# Patient Record
Sex: MALE | Race: WHITE | NOT HISPANIC OR LATINO | Employment: FULL TIME | URBAN - NONMETROPOLITAN AREA
[De-identification: names, ages, dates, MRNs, and addresses within clinical notes are randomized per-mention and may not be internally consistent; named-entity substitution may affect disease eponyms.]

---
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
## 2019-05-15 ENCOUNTER — OFFICE VISIT (OUTPATIENT)
Dept: FAMILY MEDICINE | Facility: OTHER | Age: 19
End: 2019-05-15
Attending: FAMILY MEDICINE
Payer: MEDICAID

## 2019-05-15 VITALS
HEIGHT: 70 IN | SYSTOLIC BLOOD PRESSURE: 124 MMHG | HEART RATE: 80 BPM | WEIGHT: 244 LBS | BODY MASS INDEX: 34.93 KG/M2 | TEMPERATURE: 97.8 F | DIASTOLIC BLOOD PRESSURE: 84 MMHG | RESPIRATION RATE: 16 BRPM

## 2019-05-15 DIAGNOSIS — Z23 NEED FOR HPV VACCINATION: ICD-10-CM

## 2019-05-15 DIAGNOSIS — Z13.0 SCREENING FOR DEFICIENCY ANEMIA: ICD-10-CM

## 2019-05-15 DIAGNOSIS — Z13.220 SCREENING FOR HYPERLIPIDEMIA: ICD-10-CM

## 2019-05-15 DIAGNOSIS — Z13.228 SCREENING FOR METABOLIC DISORDER: ICD-10-CM

## 2019-05-15 DIAGNOSIS — Z00.00 VISIT FOR PREVENTIVE HEALTH EXAMINATION: Primary | ICD-10-CM

## 2019-05-15 LAB
ALBUMIN SERPL-MCNC: 4.7 G/DL (ref 3.5–5.7)
ALP SERPL-CCNC: 69 U/L (ref 34–104)
ALT SERPL W P-5'-P-CCNC: 35 U/L (ref 7–52)
ANION GAP SERPL CALCULATED.3IONS-SCNC: 8 MMOL/L (ref 3–14)
AST SERPL W P-5'-P-CCNC: 23 U/L (ref 13–39)
BASOPHILS # BLD AUTO: 0.1 10E9/L (ref 0–0.2)
BASOPHILS NFR BLD AUTO: 0.7 %
BILIRUB SERPL-MCNC: 0.8 MG/DL (ref 0.3–1)
BUN SERPL-MCNC: 14 MG/DL (ref 7–25)
CALCIUM SERPL-MCNC: 9.8 MG/DL (ref 8.6–10.3)
CHLORIDE SERPL-SCNC: 102 MMOL/L (ref 98–107)
CHOLEST SERPL-MCNC: 150 MG/DL
CO2 SERPL-SCNC: 29 MMOL/L (ref 21–31)
CREAT SERPL-MCNC: 0.87 MG/DL (ref 0.7–1.3)
DIFFERENTIAL METHOD BLD: NORMAL
EOSINOPHIL # BLD AUTO: 0.3 10E9/L (ref 0–0.7)
EOSINOPHIL NFR BLD AUTO: 4.5 %
ERYTHROCYTE [DISTWIDTH] IN BLOOD BY AUTOMATED COUNT: 12.6 % (ref 10–15)
GFR SERPL CREATININE-BSD FRML MDRD: >90 ML/MIN/{1.73_M2}
GLUCOSE SERPL-MCNC: 95 MG/DL (ref 70–105)
HCT VFR BLD AUTO: 47.1 % (ref 40–53)
HDLC SERPL-MCNC: 35 MG/DL (ref 23–92)
HGB BLD-MCNC: 16.6 G/DL (ref 13.3–17.7)
IMM GRANULOCYTES # BLD: 0 10E9/L (ref 0–0.4)
IMM GRANULOCYTES NFR BLD: 0.3 %
LDLC SERPL CALC-MCNC: 81 MG/DL
LYMPHOCYTES # BLD AUTO: 1.9 10E9/L (ref 0.8–5.3)
LYMPHOCYTES NFR BLD AUTO: 28.1 %
MCH RBC QN AUTO: 29.8 PG (ref 26.5–33)
MCHC RBC AUTO-ENTMCNC: 35.2 G/DL (ref 31.5–36.5)
MCV RBC AUTO: 85 FL (ref 78–100)
MONOCYTES # BLD AUTO: 0.5 10E9/L (ref 0–1.3)
MONOCYTES NFR BLD AUTO: 7.3 %
NEUTROPHILS # BLD AUTO: 4.1 10E9/L (ref 1.6–8.3)
NEUTROPHILS NFR BLD AUTO: 59.1 %
NONHDLC SERPL-MCNC: 115 MG/DL
PLATELET # BLD AUTO: 238 10E9/L (ref 150–450)
POTASSIUM SERPL-SCNC: 4.3 MMOL/L (ref 3.5–5.1)
PROT SERPL-MCNC: 7.4 G/DL (ref 6.4–8.9)
RBC # BLD AUTO: 5.57 10E12/L (ref 4.4–5.9)
SODIUM SERPL-SCNC: 139 MMOL/L (ref 134–144)
TRIGL SERPL-MCNC: 172 MG/DL
WBC # BLD AUTO: 6.9 10E9/L (ref 4–11)

## 2019-05-15 PROCEDURE — 99395 PREV VISIT EST AGE 18-39: CPT | Performed by: FAMILY MEDICINE

## 2019-05-15 PROCEDURE — 90651 9VHPV VACCINE 2/3 DOSE IM: CPT

## 2019-05-15 PROCEDURE — 90471 IMMUNIZATION ADMIN: CPT

## 2019-05-15 PROCEDURE — 36415 COLL VENOUS BLD VENIPUNCTURE: CPT | Mod: ZL | Performed by: FAMILY MEDICINE

## 2019-05-15 PROCEDURE — 80061 LIPID PANEL: CPT | Mod: ZL | Performed by: FAMILY MEDICINE

## 2019-05-15 PROCEDURE — 85025 COMPLETE CBC W/AUTO DIFF WBC: CPT | Mod: ZL | Performed by: FAMILY MEDICINE

## 2019-05-15 PROCEDURE — 80053 COMPREHEN METABOLIC PANEL: CPT | Mod: ZL | Performed by: FAMILY MEDICINE

## 2019-05-15 ASSESSMENT — MIFFLIN-ST. JEOR: SCORE: 2127

## 2019-05-15 ASSESSMENT — PAIN SCALES - GENERAL: PAINLEVEL: NO PAIN (0)

## 2019-05-15 NOTE — NURSING NOTE
"Chief Complaint   Patient presents with     Physical       Initial /84   Pulse 80   Temp 97.8  F (36.6  C) (Temporal)   Resp 16   Ht 1.784 m (5' 10.25\")   Wt 110.7 kg (244 lb)   BMI 34.76 kg/m   Estimated body mass index is 34.76 kg/m  as calculated from the following:    Height as of this encounter: 1.784 m (5' 10.25\").    Weight as of this encounter: 110.7 kg (244 lb).  Medication Reconciliation: complete    Bhavna Goss LPN     Prior to injection, verified patient identity using patient's name and date of birth.  Due to injection administration, patient instructed to remain in clinic for 15 minutes  afterwards, and to report any adverse reaction to me immediately.    Gardasil    Drug Amount Wasted:  None.  Vial/Syringe: Syringe  Expiration Date:  09/20/21  Bhavna Goss LPN..................5/15/2019   10:01 AM      "

## 2019-05-15 NOTE — PROGRESS NOTES
"Nursing Notes:   Bhavna Goss LPN  5/15/2019  9:25 AM  Signed  Chief Complaint   Patient presents with     Physical       Initial /84   Pulse 80   Temp 97.8  F (36.6  C) (Temporal)   Resp 16   Ht 1.784 m (5' 10.25\")   Wt 110.7 kg (244 lb)   BMI 34.76 kg/m    Estimated body mass index is 34.76 kg/m  as calculated from the following:    Height as of this encounter: 1.784 m (5' 10.25\").    Weight as of this encounter: 110.7 kg (244 lb).  Medication Reconciliation: complete    Bhavna Goss LPN    SUBJECTIVE:  Galen Frank  is a 19 year old male who comes in for complete evaluation.  I last saw him in 2018 for a physical.  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 At that visit, we can give him his second HPV shot and his second hepatitis A shot.  He is otherwise up-to-date on immunizations although his tetanus will be due next year.    He wants us to check some moles on his back to check.     He is still working at [employer].  He may be looking for another job. 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 He is dating the same girl for 4 years. She is an  at [employer] while working on her RN. She just got accepted after completing her generals.      May have some family history of diabetes on dad's side.     Past Medical, Family, and Social History reviewed and updated as noted below.   ROS is negative except as noted above       Allergies   Allergen Reactions     Gentamicin      Other reaction(s): Angioedema   ,   Family History   Problem Relation Age of Onset     Heart Disease Maternal Grandmother         Heart Disease,MI,      Heart Disease Other         Heart Disease,Valve replacement     Asthma Sister         Asthma   ,   No current outpatient medications on file.     No current facility-administered medications for this visit.    , History reviewed. No " "pertinent past medical history.,   Patient Active Problem List    Diagnosis Date Noted     Dyshidrotic hand dermatitis 11/13/2012     Priority: Medium     Obesity 07/31/2012     Priority: Medium   ,   Past Surgical History:   Procedure Laterality Date     OTHER SURGICAL HISTORY      2006,600000,OTHER    and   Social History     Tobacco Use     Smoking status: Never Smoker     Smokeless tobacco: Never Used   Substance Use Topics     Alcohol use: No     OBJECTIVE:  /84   Pulse 80   Temp 97.8  F (36.6  C) (Temporal)   Resp 16   Ht 1.784 m (5' 10.25\")   Wt 110.7 kg (244 lb)   BMI 34.76 kg/m     EXAM:  General Appearance: Pleasant, alert, appropriate appearance for age. No acute distress  Head Exam: Normal. Normocephalic, atraumatic.  Eye Exam:  Normal external eyes, conjunctivae, lids, cornea. DEVAUGHN. EOMI  Ear Exam: Normal TM's bilaterally. Normal auditory canals and external ears. Non-tender.  Nose Exam: Normal external nose, mucus membranes, and septum.  OroPharynx Exam:  Dental hygiene adequate. Normal buccal mucosa. Normal pharynx.  Neck Exam:  Supple, no masses or nodes. No audible bruits  Thyroid Exam: No nodules or enlargement.  Chest/Respiratory Exam: Normal chest wall and respirations. Clear to auscultation.  Cardiovascular Exam: Regular rate and rhythm. S1, S2, no murmur, click, gallop, or rubs.  Gastrointestinal Exam: Soft, non-tender, no masses or organomegaly..   Genitourinary Exam Male: Normal male genitalia. No discharge or penile ulcerations. No testicular masses or swelling. No evidence of hernia  Lymphatic Exam: Non-palpable nodes in neck, clavicular regions.  Musculoskeletal Exam: Back is straight and non-tender, full ROM of upper and lower extremities.  Foot Exam: Left and right foot: good pedal pulses  Skin: no rash or abnormalities  Neurologic Exam: Nonfocal, normal gross motor, tone coordination and no tremor.  Psychiatric Exam: Alert and oriented - appropriate affect. "     ASSESSMENT/Plan :    Galen was seen today for physical.    Diagnoses and all orders for this visit:    Visit for preventive health examination    Need for HPV vaccination  -     GH IMM-  HUMAN PAPILLOMA VIRUS (GARDASIL 9) VACCINE    Screening for deficiency anemia  -     CBC with platelets differential; Future    Screening for metabolic disorder  -     Comprehensive metabolic panel; Future    Screening for hyperlipidemia  -     Lipid Profile; Future      .Will notify of lab results when available. Discussed diet, exercise and healthy lifestyle changes. HPV #3 today.      Heladio Walton MD

## 2019-05-15 NOTE — NURSING NOTE
"Chief Complaint   Patient presents with     Physical       Initial /84   Pulse 80   Temp 97.8  F (36.6  C) (Temporal)   Resp 16   Ht 1.784 m (5' 10.25\")   Wt 110.7 kg (244 lb)   BMI 34.76 kg/m   Estimated body mass index is 34.76 kg/m  as calculated from the following:    Height as of this encounter: 1.784 m (5' 10.25\").    Weight as of this encounter: 110.7 kg (244 lb).  Medication Reconciliation: complete    Bhavna Goss LPN  "

## 2019-05-15 NOTE — LETTER
May 16, 2019      Galen Frank  [address] [address]        Dear Galen,     Your labs all look fine. Your complete blood count were normal. Your comprehensive metabolic panel (a test that looks at liver and kidney function, blood sugar, electrolytes, and nutritional status) was normal. Your cholesterol is fine.    It was a pleasure seeing you the other day.  If you have any questions, please don't hesitate to call us.           Sincerely,        Heladio Walton MD                                    Results for orders placed or performed in visit on 05/15/19   Lipid Profile   Result Value Ref Range    Cholesterol 150 <200 mg/dL    Triglycerides 172 (H) <150 mg/dL    HDL Cholesterol 35 23 - 92 mg/dL    LDL Cholesterol Calculated 81 <100 mg/dL    Non HDL Cholesterol 115 <130 mg/dL   CBC with platelets differential   Result Value Ref Range    WBC 6.9 4.0 - 11.0 10e9/L    RBC Count 5.57 4.4 - 5.9 10e12/L    Hemoglobin 16.6 13.3 - 17.7 g/dL    Hematocrit 47.1 40.0 - 53.0 %    MCV 85 78 - 100 fl    MCH 29.8 26.5 - 33.0 pg    MCHC 35.2 31.5 - 36.5 g/dL    RDW 12.6 10.0 - 15.0 %    Platelet Count 238 150 - 450 10e9/L    Diff Method Automated Method     % Neutrophils 59.1 %    % Lymphocytes 28.1 %    % Monocytes 7.3 %    % Eosinophils 4.5 %    % Basophils 0.7 %    % Immature Granulocytes 0.3 %    Absolute Neutrophil 4.1 1.6 - 8.3 10e9/L    Absolute Lymphocytes 1.9 0.8 - 5.3 10e9/L    Absolute Monocytes 0.5 0.0 - 1.3 10e9/L    Absolute Eosinophils 0.3 0.0 - 0.7 10e9/L    Absolute Basophils 0.1 0.0 - 0.2 10e9/L    Abs Immature Granulocytes 0.0 0 - 0.4 10e9/L   Comprehensive metabolic panel   Result Value Ref Range    Sodium 139 134 - 144 mmol/L    Potassium 4.3 3.5 - 5.1 mmol/L    Chloride 102 98 - 107 mmol/L    Carbon Dioxide 29 21 - 31 mmol/L    Anion Gap 8 3 - 14 mmol/L    Glucose 95 70 - 105 mg/dL    Urea Nitrogen 14 7 - 25 mg/dL    Creatinine 0.87 0.70 - 1.30 mg/dL    GFR Estimate >90 >60  mL/min/[1.73_m2]    GFR Estimate If Black >90 >60 mL/min/[1.73_m2]    Calcium 9.8 8.6 - 10.3 mg/dL    Bilirubin Total 0.8 0.3 - 1.0 mg/dL    Albumin 4.7 3.5 - 5.7 g/dL    Protein Total 7.4 6.4 - 8.9 g/dL    Alkaline Phosphatase 69 34 - 104 U/L    ALT 35 7 - 52 U/L    AST 23 13 - 39 U/L

## 2019-06-12 ENCOUNTER — OFFICE VISIT (OUTPATIENT)
Dept: FAMILY MEDICINE | Facility: OTHER | Age: 19
End: 2019-06-12
Attending: PHYSICIAN ASSISTANT
Payer: COMMERCIAL

## 2019-06-12 VITALS
RESPIRATION RATE: 18 BRPM | BODY MASS INDEX: 34.83 KG/M2 | WEIGHT: 244.5 LBS | TEMPERATURE: 99.2 F | HEART RATE: 120 BPM | DIASTOLIC BLOOD PRESSURE: 88 MMHG | SYSTOLIC BLOOD PRESSURE: 130 MMHG

## 2019-06-12 DIAGNOSIS — J02.9 VIRAL PHARYNGITIS: Primary | ICD-10-CM

## 2019-06-12 DIAGNOSIS — J30.2 SEASONAL ALLERGIC RHINITIS, UNSPECIFIED TRIGGER: ICD-10-CM

## 2019-06-12 DIAGNOSIS — H65.92 FLUID LEVEL BEHIND TYMPANIC MEMBRANE OF LEFT EAR: ICD-10-CM

## 2019-06-12 PROCEDURE — G0463 HOSPITAL OUTPT CLINIC VISIT: HCPCS

## 2019-06-12 PROCEDURE — 99213 OFFICE O/P EST LOW 20 MIN: CPT | Performed by: NURSE PRACTITIONER

## 2019-06-12 ASSESSMENT — PAIN SCALES - GENERAL: PAINLEVEL: NO PAIN (0)

## 2019-06-12 NOTE — PROGRESS NOTES
"Subjective     Galen Frank is a 19 year old male who presents to clinic today for the following health issues:    HPI   Acute Illness   Acute illness concerns: sore throat  Onset: Last week    Fever: no    Chills/Sweats: no    Headache (location?): no    Sinus Pressure:no    Conjunctivitis:  no    Ear Pain: YES- pressure    Rhinorrhea: YES- \"kind of. Wake up stuffed up then runny throughout the day.\"    Congestion: YES- \"stuffed up\"    Sore Throat: YES- started  night/Monday morning, currently 3/10 and can increase to 5-6/10, swallowing increases discomfort    Post-nasal drainage: \"I don't think so\"     Cough: YES- \"a little bit\"    Sneezing: YES -\"here and there. More last week than now\"    Wheeze: no    Decreased Appetite: no    Nausea: no    Vomiting: no    Diarrhea:  no    Dysuria/Freq.: no    Fatigue/Achiness: no    Sick/Strep Exposure: Girlfriend started with allergies and then got \"a little cold with same symptoms but usually I don't get what she gets.\"     Therapies Tried and outcome: cough drop- helpful, warm and cold foods/fluids are helpful         Patient Active Problem List   Diagnosis     Dyshidrotic hand dermatitis     Obesity     Past Surgical History:   Procedure Laterality Date     OTHER SURGICAL HISTORY      ,[id number],OTHER       Social History     Tobacco Use     Smoking status: Never Smoker     Smokeless tobacco: Never Used   Substance Use Topics     Alcohol use: No     Family History   Problem Relation Age of Onset     Heart Disease Maternal Grandmother         Heart Disease,MI,      Heart Disease Other         Heart Disease,Valve replacement     Asthma Sister         Asthma         No current outpatient medications on file.     Allergies   Allergen Reactions     Gentamicin      Other reaction(s): Angioedema     Recent Labs   Lab Test 05/15/19  1005   LDL 81   HDL 35   TRIG 172*   ALT 35   CR 0.87   GFRESTIMATED >90   GFRESTBLACK >90   POTASSIUM 4.3      BP Readings from Last " 3 Encounters:   06/12/19 130/88   05/15/19 124/84   07/31/18 124/88    Wt Readings from Last 3 Encounters:   06/12/19 110.9 kg (244 lb 8 oz)   05/15/19 110.7 kg (244 lb)   07/31/18 110.2 kg (243 lb) (99 %)*     * Growth percentiles are based on [facility] (Boys, 2-20 Years) data.                      Reviewed and updated as needed this visit by Provider         Review of Systems   ROS COMP: Constitutional, HEENT, cardiovascular, pulmonary, gi and gu systems are negative, except as otherwise noted.      Objective    /88 (BP Location: Left arm, Patient Position: Sitting, Cuff Size: Adult Large)   Pulse 120   Temp 99.2  F (37.3  C) (Tympanic)   Resp 18   Wt 110.9 kg (244 lb 8 oz)   BMI 34.83 kg/m    Body mass index is 34.83 kg/m .  Physical Exam   GENERAL: healthy, alert and no distress  EYES: Eyes grossly normal to inspection, PERRLA and conjunctivae and sclerae normal  HENT: ear canals and TM's normal, LEFT TM: serous fluid, no erythema, positive light reflex. No tonsillar hypertrophy, uvula midline, small white ulcerations on erythematous base across soft palate, uvula, and tonsils, no exudate.   NECK: no adenopathy, no asymmetry, masses, or scars  RESP: lungs clear to auscultation - no rales, rhonchi or wheezes  CV: regular rate and rhythm, normal S1 S2, no S3 or S4, no murmur, click or rub  PSYCH: mentation appears normal, affect normal     Diagnostic Test Results:  none         Assessment & Plan     1. Viral pharyngitis  Acute, symptomatic. Ulcerations on roof of mouth consistent with viral pharyngitis versus bacterial. Continue with symptomatic treatments- cough drops, tylenol, ibuprofen, warm salt water gurgles, honey.    2. Seasonal allergic rhinitis, unspecified trigger  Can try OTC zyrtec, claritin, allegra (generic is okay) and/or OTC flonase, nasocort (generic is okay) to see if this is helpful with congestion and ear symptoms.    3. Fluid level behind tympanic membrane of left ear  As #2  Usually  spontaneously resolves within 12 weeks, if no improvement can consider referral to ENT.      FUTURE APPOINTMENTS:       - Follow-up visit: No improvement or worsening symptoms    No follow-ups on file.    Yara Ludwig CNP  [facility]

## 2019-06-12 NOTE — NURSING NOTE
"Chief Complaint   Patient presents with     Pharyngitis       Initial /88 (BP Location: Left arm, Patient Position: Sitting, Cuff Size: Adult Large)   Pulse 120   Temp 99.2  F (37.3  C) (Tympanic)   Resp 18   Wt 110.9 kg (244 lb 8 oz)   BMI 34.83 kg/m   Estimated body mass index is 34.83 kg/m  as calculated from the following:    Height as of 5/15/19: 1.784 m (5' 10.25\").    Weight as of this encounter: 110.9 kg (244 lb 8 oz).  Medication Reconciliation: complete    Tiffany Feng LPN  "

## 2019-06-13 NOTE — PATIENT INSTRUCTIONS
Assessment & Plan     1. Viral pharyngitis  Acute, symptomatic. Ulcerations on roof of mouth consistent with viral pharyngitis versus bacterial. Continue with symptomatic treatments- cough drops, tylenol, ibuprofen, warm salt water gurgles, honey.    2. Seasonal allergic rhinitis, unspecified trigger  Can try OTC zyrtec, claritin, allegra (generic is okay) and/or OTC flonase, nasocort (generic is okay) to see if this is helpful with congestion and ear symptoms.    3. Fluid level behind tympanic membrane of left ear  As #2  Usually spontaneously resolves within 12 weeks, if no improvement can consider referral to ENT.      FUTURE APPOINTMENTS:       - Follow-up visit: No improvement or worsening symptoms    No follow-ups on file.    Yara Ludwig CNP  [facility]  Patient Education     Viral Pharyngitis (Sore Throat)    You or your child have pharyngitis (sore throat). This infection is caused by a virus. It can cause throat pain that is worse when swallowing, aching all over, headache, and fever. The infection may be spread by coughing, kissing, or touching others after touching your mouth or nose. Antibiotic medicines do not work against viruses. They are not used for treating this illness.  Home care    If symptoms are severe, you or your child should rest at home. Return to work or school when you or your child feel well enough.     You or your child should drink plenty of fluids to prevent dehydration.    Use throat lozenges or numbing throat sprays to help reduce pain. Gargling with warm salt water will also help reduce throat pain. Dissolve 1/2 teaspoon of salt in 1 glass of warm water. Children can sip on juice or a popsicle. Children 5 years and older can also suck on a lollipop or hard candy.    Don t eat salty or spicy foods or give them to your child. These can be irritating to the throat.  Medicines for a child: You can give your child acetaminophen for fever, fussiness, or  discomfort. In babies over 6 months of age, you may use ibuprofen instead of acetaminophen. If your child has chronic liver or kidney disease or ever had a stomach ulcer or GI bleeding, talk with your child s healthcare provider before giving these medicines. Aspirin should never be used by any child under 18 years of age who has a fever. It may cause severe liver damage.  Medicines for an adult: You may use acetaminophen or ibuprofen to control pain or fever, unless another medicine was prescribed for this. If you have chronic liver or kidney disease or ever had a stomach ulcer or GI bleeding, talk with your healthcare provider before using these medicines.  Follow-up care  Follow up with a healthcare provider or our staff if you or your child are not getting better over the next week.  When to seek medical advice  Call your healthcare provider right away if any of these occur:    Fever as directed by your healthcare provider.  For children, seek care if:  ? Your child is of any age and has repeated fevers above 104 F (40 C).  ? Your child is younger than 2 years of age and has a fever of 100.4 F (38 C) for more than 1 day.  ? Your child is 2 years old or older and has a fever of 100.4 F (38 C) for more than 3 days.    New or worsening ear pain, sinus pain, or headache    Painful lumps in the back of neck    Stiff neck    Lymph nodes are getting larger    Can t swallow liquids, a lot of drooling, or can t open mouth wide due to throat pain    Signs of dehydration, such as very dark urine or no urine, sunken eyes, dizziness    Trouble breathing or noisy breathing    Muffled voice    New rash    Other symptoms are getting worse  Date Last Reviewed: 10/1/2017    6918-6746 [employer]. [address] [place] [address], [place] [address]. All rights reserved. This information is not intended as a substitute for professional medical care. Always follow your healthcare professional's instructions.

## 2019-06-17 ENCOUNTER — OFFICE VISIT (OUTPATIENT)
Dept: FAMILY MEDICINE | Facility: OTHER | Age: 19
End: 2019-06-17
Attending: NURSE PRACTITIONER
Payer: COMMERCIAL

## 2019-06-17 VITALS
HEART RATE: 80 BPM | HEIGHT: 70 IN | WEIGHT: 245 LBS | TEMPERATURE: 98.5 F | SYSTOLIC BLOOD PRESSURE: 116 MMHG | DIASTOLIC BLOOD PRESSURE: 72 MMHG | RESPIRATION RATE: 18 BRPM | OXYGEN SATURATION: 98 % | BODY MASS INDEX: 35.07 KG/M2

## 2019-06-17 DIAGNOSIS — J03.90 TONSILLITIS: Primary | ICD-10-CM

## 2019-06-17 DIAGNOSIS — J02.9 SORE THROAT: ICD-10-CM

## 2019-06-17 LAB
DEPRECATED S PYO AG THROAT QL EIA: NORMAL
SPECIMEN SOURCE: NORMAL

## 2019-06-17 PROCEDURE — 87070 CULTURE OTHR SPECIMN AEROBIC: CPT | Mod: ZL | Performed by: NURSE PRACTITIONER

## 2019-06-17 PROCEDURE — G0463 HOSPITAL OUTPT CLINIC VISIT: HCPCS

## 2019-06-17 PROCEDURE — 99214 OFFICE O/P EST MOD 30 MIN: CPT | Performed by: NURSE PRACTITIONER

## 2019-06-17 PROCEDURE — 87880 STREP A ASSAY W/OPTIC: CPT | Mod: ZL | Performed by: NURSE PRACTITIONER

## 2019-06-17 RX ORDER — CEFDINIR 300 MG/1
300 CAPSULE ORAL 2 TIMES DAILY
Qty: 20 CAPSULE | Refills: 0 | Status: SHIPPED | OUTPATIENT
Start: 2019-06-17 | End: 2019-06-21

## 2019-06-17 ASSESSMENT — ENCOUNTER SYMPTOMS
NEUROLOGICAL NEGATIVE: 1
MUSCULOSKELETAL NEGATIVE: 1
COUGH: 0
RHINORRHEA: 0
GASTROINTESTINAL NEGATIVE: 1
SORE THROAT: 1
FEVER: 0

## 2019-06-17 ASSESSMENT — PAIN SCALES - GENERAL: PAINLEVEL: EXTREME PAIN (8)

## 2019-06-17 ASSESSMENT — MIFFLIN-ST. JEOR: SCORE: 2131.53

## 2019-06-17 NOTE — NURSING NOTE
Patient presents to clinic for follow up with ongoing sore throat, sinus drainage and cough for past 2 weeks.  He was seen in clinic for same symptoms on 06/12/19.    Medication Reconciliation: complete    Sakina Ag LPN

## 2019-06-17 NOTE — PATIENT INSTRUCTIONS
Patient Education   Your rapid strep is negative.   We have a throat culture pending. Will start treatment today for a bacterial cause.   Will notify you if the culture is positive.   Follow up with your primary care provider at the end of the week for re-evaluation.   Gargle with salt water and push fluids.   Take medications as ordered.       When You Have a Sore Throat    A sore throat can be painful. There are many reasons why you may have a sore throat. Your healthcare provider will work with you to find the cause of your sore throat. He or she will also find the best treatment for you.  What causes a sore throat?  Sore throats can be caused or worsened by:    Cold or flu viruses    Bacteria    Irritants such as tobacco smoke or air pollution    Acid reflux  A healthy throat  The tonsils are on the sides of the throat near the base of the tongue. They collect viruses and bacteria and help fight infection. The throat (pharynx) is the passage for air. Mucus from the nasal cavity also moves down the passage.  An inflamed throat  The tonsils and pharynx can become inflamed due to a cold or flu virus. Postnasal drip (excess mucus draining from the nasal cavity) can irritate the throat. It can also make the throat or tonsils more likely to be infected by bacteria. Severe, untreated tonsillitis in children or adults can cause a pocket of pus (abscess) to form near the tonsil.  Your evaluation  A medical evaluation can help find the cause of your sore throat. It can also help your healthcare provider choose the best treatment for you. The evaluation may include a health history, physical exam, and diagnostic tests.  Health history  Your healthcare provider may ask you the following:    How long has the sore throat lasted and how have you been treating it?    Do you have any other symptoms, such as body aches, fever, or cough?    Does your sore throat recur? If so, how often? How many days of school or work have you  "missed because of a sore throat?    Do you have trouble eating or swallowing?    Have you been told that you snore or have other sleep problems?    Do you have bad breath?    Do you cough up bad-tasting mucus?  Physical exam  During the exam, your healthcare provider checks your ears, nose, and throat for problems. He or she also checks for swelling in the neck, and may listen to your chest.  Possible tests  Other tests your healthcare provider may perform include:    A throat swab to check for bacteria such as streptococcus (the bacteria that causes strep throat)    A blood test to check for mononucleosis (a viral infection)    A chest X-ray to rule out pneumonia, especially if you have a cough  Treating a sore throat  Treatment depends on many factors. What is the likely cause? Is the problem recent? Does it keep coming back? In many cases, the best thing to do is to treat the symptoms, rest, and let the problem heal itself. Antibiotics may help clear up some bacterial infections. For cases of severe or recurring tonsillitis, the tonsils may need to be removed.  Relieving your symptoms    Don t smoke, and avoid secondhand smoke.    For children, try throat sprays or Popsicles. Adults and older children may try lozenges.    Drink warm liquids to soothe the throat and help thin mucus. Avoid alcohol, spicy foods, and acidic drinks such as orange juice. These can irritate the throat.    Gargle with warm saltwater (1 teaspoon of salt to 8 ounces of warm water).    Use a humidifier to keep air moist and relieve throat dryness.    Try over-the-counter pain relievers such as acetaminophen or ibuprofen. Use as directed, and don t exceed the recommended dose. Don t give aspirin to children.   Are antibiotics needed?  If your sore throat is due to a bacterial infection, antibiotics may speed healing and prevent complications. Although group A streptococcus (\"strep throat\" or GAS) is the major treatable infection for a sore " throat, GAS causes only 5% to 15% of sore throats in adults who seek medical care. Most sore throats are caused by cold or flu viruses. And antibiotics don t treat viral illness. In fact, using antibiotics when they re not needed may produce bacteria that are harder to kill. Your healthcare provider will prescribe antibiotics only if he or she thinks they are likely to help.  If antibiotics are prescribed  Take the medicine exactly as directed. Be sure to finish your prescription even if you re feeling better. And be sure to ask your healthcare provider or pharmacist what side effects are common and what to do about them.  Is surgery needed?  In some cases, tonsils need to be removed. This is often done as outpatient (same-day) surgery. Your healthcare provider may advise removing the tonsils in cases of:    Several severe bouts of tonsillitis in a year.  Severe  episodes include those that lead to missed days of school or work, or that need to be treated with antibiotics.    Tonsillitis that causes breathing problems during sleep    Tonsillitis caused by food particles collecting in pouches in the tonsils (cryptic tonsillitis)  Call your healthcare provider if any of the following occur:    Symptoms worsen, or new symptoms develop.    Swollen tonsils make breathing difficult.    The pain is severe enough to keep you from drinking liquids.    A skin rash, hives, or wheezing develops. Any of these could signal an allergic reaction to antibiotics.    Symptoms don t improve within a week.    Symptoms don t improve within 2 to 3 days of starting antibiotics.   Date Last Reviewed: 10/1/2016    3462-8401 [employer]. [address] [place] [address], [place] [id number]. All rights reserved. This information is not intended as a substitute for professional medical care. Always follow your healthcare professional's instructions.

## 2019-06-17 NOTE — PROGRESS NOTES
SUBJECTIVE:   Galen Frank is a 19 year old male who presents to clinic today for the following health issues:    HPI  Presents with a sore throat for 10 days or so. Had been here last week, determined to be allergies or viral cause for his pain. Sore throat has been persistent. Always has rhinitis, no PND. No cough, no fever, no HA. Eating and drinking less, is pushing fluids, using cough drops for symptoms. Taking ibuprofen for pain.   Girlfriend was ill with her allergies as well, her symptoms have resolved. No other known strep exposure although he works with the public.       Patient Active Problem List    Diagnosis Date Noted     Dyshidrotic hand dermatitis 2012     Priority: Medium     Obesity 2012     Priority: Medium     History reviewed. No pertinent past medical history.   Past Surgical History:   Procedure Laterality Date     OTHER SURGICAL HISTORY      ,OTHER     Family History   Problem Relation Age of Onset     Heart Disease Maternal Grandmother         Heart Disease,MI,      Heart Disease Other         Heart Disease,Valve replacement     Asthma Sister         Asthma     Social History     Tobacco Use     Smoking status: Never Smoker     Smokeless tobacco: Never Used   Substance Use Topics     Alcohol use: No     Social History     Social History Narrative    In seventh grade .  In 8 grade .  Played football    Going into 11th grade, 2015. Works at [employer].     No current outpatient medications on file.     Allergies   Allergen Reactions     Gentamicin      Other reaction(s): Angioedema       Review of Systems   Constitutional: Negative for fever.   HENT: Positive for sore throat. Negative for congestion and rhinorrhea.    Respiratory: Negative for cough.    Gastrointestinal: Negative.    Musculoskeletal: Negative.    Skin: Negative.    Neurological: Negative.         OBJECTIVE:     /72 (BP Location: Right arm, Patient Position: Sitting, Cuff  "Size: Adult Large)   Pulse 80   Temp 98.5  F (36.9  C) (Tympanic)   Resp 18   Ht 1.784 m (5' 10.25\")   Wt 111.1 kg (245 lb)   SpO2 98%   BMI 34.90 kg/m    Body mass index is 34.9 kg/m .  Physical Exam   Constitutional: He is oriented to person, place, and time. Vital signs are normal. He appears well-developed and well-nourished. He is active and cooperative.  Non-toxic appearance. He does not appear ill. No distress.   HENT:   Head: Normocephalic and atraumatic.   Right Ear: External ear normal.   Left Ear: External ear normal.   Nose: Nose normal.   White pustules with surrounding erythema to uvula and more right side of throat. No evidence of abscess or trismus.    Eyes: Conjunctivae are normal. Right eye exhibits no discharge. Left eye exhibits no discharge. No scleral icterus.   Neck: Normal range of motion.   Cardiovascular: Normal rate, regular rhythm and normal heart sounds.   No murmur heard.  Pulmonary/Chest: Effort normal and breath sounds normal.   Musculoskeletal: Normal range of motion.   Lymphadenopathy:     He has no cervical adenopathy.   Neurological: He is alert and oriented to person, place, and time.   Skin: Skin is warm and dry. No rash noted. He is not diaphoretic.   Psychiatric: He has a normal mood and affect.   Nursing note and vitals reviewed.      Diagnostic Test Results:  Results for orders placed or performed in visit on 06/17/19 (from the past 24 hour(s))   Strep, Rapid Screen   Result Value Ref Range    Specimen Description Throat     Rapid Strep A Screen       Negative presumptive for Group A Beta Streptococcus       ASSESSMENT/PLAN:       ICD-10-CM    1. Tonsillitis J03.90 cefdinir (OMNICEF) 300 MG capsule   2. Sore throat J02.9 Strep, Rapid Screen     Throat Culture Aerobic Bacterial     PLAN:  Pt with sore throat, no abscess or trismus noted.   Rapid strep is negative. He's afebrile, NAD.   Throat culture ordered, r/o yeast added.   Will treat with omnicef d/t severity and " duration of symptoms.   Advised to see PCP in 3 days if not improving, sooner PRN.   Given Epic educational materials.   Return to ED if sx worsen.   Given Epic educational materials.     I explained my diagnostic considerations and recommendations to pt who voiced understanding and agreement with the treatment plan. All questions were answered. We discussed potential side effects of any prescribed or recommended therapies, as well as expectations for response to treatments.    Disclaimer:  This note consists of words and symbols derived from keyboarding, dictation, or using voice recognition software. As a result, there may be errors in the script that have gone undetected. Please consider this when interpreting information found in this note.      CECILIA Roy, NP-C  6/17/2019 at 5:34 PM  [facility]      Following visit, considered this could be a mono type of illness. Orders placed.  Pt to return tomorrow for lab work and will notify him of results.     CECILIA Roy, NP-C  8:15 PM June 17, 2019

## 2019-06-18 ENCOUNTER — ALLIED HEALTH/NURSE VISIT (OUTPATIENT)
Dept: FAMILY MEDICINE | Facility: OTHER | Age: 19
End: 2019-06-18
Attending: NURSE PRACTITIONER
Payer: COMMERCIAL

## 2019-06-18 DIAGNOSIS — J03.90 TONSILLITIS: Primary | ICD-10-CM

## 2019-06-18 DIAGNOSIS — R53.83 FATIGUE: Primary | ICD-10-CM

## 2019-06-18 LAB — HETEROPH AB SER QL: NEGATIVE

## 2019-06-18 PROCEDURE — 86308 HETEROPHILE ANTIBODY SCREEN: CPT | Mod: ZL | Performed by: NURSE PRACTITIONER

## 2019-06-18 PROCEDURE — 36415 COLL VENOUS BLD VENIPUNCTURE: CPT | Mod: ZL | Performed by: NURSE PRACTITIONER

## 2019-06-19 LAB
BACTERIA SPEC CULT: NORMAL
SPECIMEN SOURCE: NORMAL

## 2019-06-19 NOTE — NURSING NOTE
Chief Complaint   Patient presents with     Lab Only   Patient presented to the clinic today for a lab only appointment to get tested for mono    Medication Reconciliation: completed   Iris Mcdonough LPN  6/18/2019 7:12 PM

## 2019-06-21 ENCOUNTER — OFFICE VISIT (OUTPATIENT)
Dept: FAMILY MEDICINE | Facility: OTHER | Age: 19
End: 2019-06-21
Attending: NURSE PRACTITIONER
Payer: COMMERCIAL

## 2019-06-21 VITALS
RESPIRATION RATE: 18 BRPM | HEART RATE: 75 BPM | WEIGHT: 239.5 LBS | HEIGHT: 70 IN | SYSTOLIC BLOOD PRESSURE: 120 MMHG | OXYGEN SATURATION: 99 % | BODY MASS INDEX: 34.29 KG/M2 | DIASTOLIC BLOOD PRESSURE: 78 MMHG | TEMPERATURE: 97.2 F

## 2019-06-21 DIAGNOSIS — J03.90 TONSILLITIS: Primary | ICD-10-CM

## 2019-06-21 PROCEDURE — G0463 HOSPITAL OUTPT CLINIC VISIT: HCPCS

## 2019-06-21 PROCEDURE — 99213 OFFICE O/P EST LOW 20 MIN: CPT | Performed by: NURSE PRACTITIONER

## 2019-06-21 RX ORDER — CEFDINIR 300 MG/1
300 CAPSULE ORAL 2 TIMES DAILY
Qty: 13 CAPSULE | Refills: 0 | Status: SHIPPED | OUTPATIENT
Start: 2019-06-21 | End: 2019-06-27

## 2019-06-21 ASSESSMENT — ENCOUNTER SYMPTOMS
SORE THROAT: 1
FATIGUE: 0
COUGH: 0
RHINORRHEA: 0
FEVER: 0
TROUBLE SWALLOWING: 0
VOICE CHANGE: 0
CHILLS: 0

## 2019-06-21 ASSESSMENT — MIFFLIN-ST. JEOR: SCORE: 2106.58

## 2019-06-21 ASSESSMENT — PAIN SCALES - GENERAL: PAINLEVEL: NO PAIN (0)

## 2019-06-21 NOTE — NURSING NOTE
Patient presents to clinic for follow up with sore throat.  He states throat is still scratchy and he has lost prescription of Cefdinir.    Medication Reconciliation: complete    Sakina Ag LPN

## 2019-06-21 NOTE — PROGRESS NOTES
"  SUBJECTIVE:   Galen Frank is a 19 year old male who presents to clinic today for the following health issues:    HPI  Patient presents for re-evaluation of throat pain. He was seen in in clinic on 6/12 with sore throat. Strep was negative and was treated with symptomatic cares. After no improvement he was re-evaluated and started on Cefdinir with improvement in his symptoms. He presents today for follow-up. He has been tolerating medication without issue, throat pain is improved. He did lose his prescription last night and has yet to take his morning dose. He is still taking Advil once a day. He reports some scratching throat. No difficulty swallowing. No fevers.     Patient Active Problem List    Diagnosis Date Noted     Dyshidrotic hand dermatitis 11/13/2012     Priority: Medium     Obesity 07/31/2012     Priority: Medium     History reviewed. No pertinent past medical history.   Past Surgical History:   Procedure Laterality Date     OTHER SURGICAL HISTORY      2006,600000,OTHER       Review of Systems   Constitutional: Negative for chills, fatigue and fever.   HENT: Positive for sore throat. Negative for congestion, ear discharge, ear pain, mouth sores, rhinorrhea, trouble swallowing and voice change.    Respiratory: Negative for cough.         OBJECTIVE:     /78 (BP Location: Right arm, Patient Position: Sitting, Cuff Size: Adult Large)   Pulse 75   Temp 97.2  F (36.2  C) (Tympanic)   Resp 18   Ht 1.784 m (5' 10.25\")   Wt 108.6 kg (239 lb 8 oz)   SpO2 99%   BMI 34.12 kg/m    Body mass index is 34.12 kg/m .  Physical Exam   Constitutional: He appears well-developed and well-nourished. No distress.   HENT:   Right Ear: External ear normal.   Left Ear: External ear normal.   Nose: Nose normal.   Mouth/Throat: Oropharyngeal exudate and posterior oropharyngeal erythema present. No posterior oropharyngeal edema or tonsillar abscesses.   Eyes: Conjunctivae are normal.   Neck: Normal range of " motion.     Diagnostic Test Results:  none     ASSESSMENT/PLAN:   1. Tonsillitis  Patient reports improvement in symptoms and afebrile since starting antibiotic. Throat is still erythematous and some scattered white patches. I requested remaining dose of antibiotics for patient so he can complete full 10 day course. He will  script today. Continue with symptomatic care. Discussed if worsening symptoms develop or fevers he should be re-evaluated. Follow-up as needed.   - cefdinir (OMNICEF) 300 MG capsule; Take 1 capsule (300 mg) by mouth 2 times daily for 6 days  Dispense: 13 capsule; Refill: 0      Amrita Key [facility]

## 2020-10-03 ENCOUNTER — ALLIED HEALTH/NURSE VISIT (OUTPATIENT)
Dept: FAMILY MEDICINE | Facility: OTHER | Age: 20
End: 2020-10-03
Payer: COMMERCIAL

## 2020-10-03 DIAGNOSIS — R50.9 FEVER AND CHILLS: Primary | ICD-10-CM

## 2020-10-03 PROCEDURE — 99207 PR NO CHARGE NURSE ONLY: CPT

## 2020-10-03 PROCEDURE — C9803 HOPD COVID-19 SPEC COLLECT: HCPCS

## 2020-10-03 PROCEDURE — U0003 INFECTIOUS AGENT DETECTION BY NUCLEIC ACID (DNA OR RNA); SEVERE ACUTE RESPIRATORY SYNDROME CORONAVIRUS 2 (SARS-COV-2) (CORONAVIRUS DISEASE [COVID-19]), AMPLIFIED PROBE TECHNIQUE, MAKING USE OF HIGH THROUGHPUT TECHNOLOGIES AS DESCRIBED BY CMS-2020-01-R: HCPCS | Mod: ZL

## 2020-10-04 LAB
SARS-COV-2 RNA SPEC QL NAA+PROBE: NOT DETECTED
SPECIMEN SOURCE: NORMAL

## 2021-01-03 ENCOUNTER — HEALTH MAINTENANCE LETTER (OUTPATIENT)
Age: 21
End: 2021-01-03

## 2021-03-06 ENCOUNTER — HOSPITAL ENCOUNTER (EMERGENCY)
Facility: HOSPITAL | Age: 21
Discharge: HOME | End: 2021-03-06
Attending: PHYSICIAN ASSISTANT | Admitting: PHYSICIAN ASSISTANT
Payer: COMMERCIAL

## 2021-03-06 VITALS
SYSTOLIC BLOOD PRESSURE: 148 MMHG | HEART RATE: 100 BPM | RESPIRATION RATE: 16 BRPM | OXYGEN SATURATION: 99 % | TEMPERATURE: 98.1 F | DIASTOLIC BLOOD PRESSURE: 91 MMHG

## 2021-03-06 DIAGNOSIS — J02.9 PHARYNGITIS: ICD-10-CM

## 2021-03-06 DIAGNOSIS — J02.9 PHARYNGITIS, UNSPECIFIED ETIOLOGY: ICD-10-CM

## 2021-03-06 LAB
SPECIMEN SOURCE: NORMAL
STREP GROUP A PCR: NOT DETECTED

## 2021-03-06 PROCEDURE — G0463 HOSPITAL OUTPT CLINIC VISIT: HCPCS

## 2021-03-06 PROCEDURE — 87651 STREP A DNA AMP PROBE: CPT | Performed by: PHYSICIAN ASSISTANT

## 2021-03-06 PROCEDURE — 99213 OFFICE O/P EST LOW 20 MIN: CPT | Performed by: PHYSICIAN ASSISTANT

## 2021-03-06 RX ORDER — DIPHENHYDRAMINE HYDROCHLORIDE AND LIDOCAINE HYDROCHLORIDE AND ALUMINUM HYDROXIDE AND MAGNESIUM HYDRO
5-10 KIT EVERY 6 HOURS PRN
Qty: 237 ML | Refills: 0 | Status: SHIPPED | OUTPATIENT
Start: 2021-03-06 | End: 2022-01-29

## 2021-03-06 ASSESSMENT — ENCOUNTER SYMPTOMS
MYALGIAS: 1
SORE THROAT: 1

## 2021-03-06 NOTE — ED PROVIDER NOTES
History     Chief Complaint   Patient presents with     Pharyngitis     HPI  Galen Frank is a 20 year old male who presents to urgent care for evaluation of sore throat.  Patient states that this started approximately 1 week ago.  He states that he did have 1 day where he had body aches for a couple hours that resolved.  Patient denies any fever, cough, congestion, runny nose, otalgia, nausea, vomiting, diarrhea, or any other associated symptoms.  Patient states that he has been taking Tylenol, ibuprofen and utilizing cough lozenges.    Allergies:  Allergies   Allergen Reactions     Gentamicin      Other reaction(s): Angioedema       Problem List:    Patient Active Problem List    Diagnosis Date Noted     Dyshidrotic hand dermatitis 2012     Priority: Medium     Obesity 2012     Priority: Medium        Past Medical History:    No past medical history on file.    Past Surgical History:    Past Surgical History:   Procedure Laterality Date     OTHER SURGICAL HISTORY      ,,OTHER       Family History:    Family History   Problem Relation Age of Onset     Heart Disease Maternal Grandmother         Heart Disease,MI,      Heart Disease Other         Heart Disease,Valve replacement     Asthma Sister         Asthma       Social History:  Marital Status:  Single [1]  Social History     Tobacco Use     Smoking status: Never Smoker     Smokeless tobacco: Never Used   Substance Use Topics     Alcohol use: No     Drug use: Never     Comment: Drug use: No        Medications:    magic mouthwash suspension, diphenhydrAMINE, lidocaine, aluminum-magnesium & simethicone, (FIRST-MOUTHWASH BLM) compounding kit          Review of Systems   HENT: Positive for sore throat.    Musculoskeletal: Positive for myalgias.   All other systems reviewed and are negative.      Physical Exam   BP: 148/91  Pulse: 100  Temp: 98.1  F (36.7  C)  Resp: 16  SpO2: 99 %      Physical Exam  Vitals signs and nursing note  reviewed.   Constitutional:       General: He is not in acute distress.     Appearance: He is well-developed. He is not ill-appearing or toxic-appearing.   HENT:      Right Ear: Tympanic membrane normal.      Left Ear: Tympanic membrane normal.      Mouth/Throat:      Mouth: Oral lesions present.      Pharynx: Posterior oropharyngeal erythema present.      Tonsils: No tonsillar exudate or tonsillar abscesses.      Comments: Multiple oral ulcerations present in posterior pharynx  Eyes:      Conjunctiva/sclera: Conjunctivae normal.      Pupils: Pupils are equal, round, and reactive to light.   Cardiovascular:      Rate and Rhythm: Regular rhythm.      Heart sounds: Normal heart sounds.   Pulmonary:      Breath sounds: Normal breath sounds.   Neurological:      Mental Status: He is oriented to person, place, and time.         ED Course        Procedures              Critical Care time:               No results found for this or any previous visit (from the past 24 hour(s)).    Medications - No data to display    Assessments & Plan (with Medical Decision Making)   #1.  Pharyngitis    Discussed exam findings with patient.  Patient has a strep swab pending and will be notified of results by phone.  She is prescribed Magic mouthwash.  Tylenol or ibuprofen as directed for pain.  Supportive cares were discussed at length with patient.  Any additional concerns please return to urgent care or follow-up with primary care provider.  Patient verbalized understanding and agreement of plan.            I have reviewed the nursing notes.    I have reviewed the findings, diagnosis, plan and need for follow up with the patient.      Discharge Medication List as of 3/6/2021 10:09 AM      START taking these medications    Details   magic mouthwash suspension, diphenhydrAMINE, lidocaine, aluminum-magnesium & simethicone, (FIRST-MOUTHWASH BLM) compounding kit Swish and spit 5-10 mLs in mouth every 6 hours as needed for mouth sores,  Disp-237 mL, R-0, E-Prescribe             Final diagnoses:   Pharyngitis       3/6/2021   [facility]     Shawn Feliciano PA-C  03/06/21 1012

## 2021-03-06 NOTE — ED TRIAGE NOTES
Pt presents with girlfriend. Pt c/o sore throat for about a week now. Pts been taking ibuprofen/tylenol which seems to be helping. Cough drops also.

## 2021-05-12 DIAGNOSIS — Z23 HIGH PRIORITY FOR 2019 NOVEL CORONAVIRUS VACCINATION: Primary | ICD-10-CM

## 2021-08-31 ENCOUNTER — APPOINTMENT (OUTPATIENT)
Dept: FAMILY MEDICINE | Facility: OTHER | Age: 21
End: 2021-08-31
Attending: CHIROPRACTOR

## 2021-08-31 PROCEDURE — 99199 UNLISTED SPECIAL SVC PX/RPRT: CPT | Performed by: CHIROPRACTOR

## 2021-10-09 ENCOUNTER — HEALTH MAINTENANCE LETTER (OUTPATIENT)
Age: 21
End: 2021-10-09

## 2022-01-29 ENCOUNTER — OFFICE VISIT (OUTPATIENT)
Dept: FAMILY MEDICINE | Facility: OTHER | Age: 22
End: 2022-01-29
Attending: PHYSICIAN ASSISTANT
Payer: COMMERCIAL

## 2022-01-29 ENCOUNTER — HEALTH MAINTENANCE LETTER (OUTPATIENT)
Age: 22
End: 2022-01-29

## 2022-01-29 VITALS
DIASTOLIC BLOOD PRESSURE: 80 MMHG | TEMPERATURE: 98.2 F | HEIGHT: 71 IN | RESPIRATION RATE: 16 BRPM | HEART RATE: 85 BPM | SYSTOLIC BLOOD PRESSURE: 122 MMHG | WEIGHT: 250 LBS | BODY MASS INDEX: 35 KG/M2 | OXYGEN SATURATION: 98 %

## 2022-01-29 DIAGNOSIS — S61.012A LACERATION OF LEFT THUMB WITHOUT FOREIGN BODY WITHOUT DAMAGE TO NAIL, INITIAL ENCOUNTER: Primary | ICD-10-CM

## 2022-01-29 DIAGNOSIS — S61.211A LACERATION OF LEFT INDEX FINGER WITHOUT FOREIGN BODY WITHOUT DAMAGE TO NAIL, INITIAL ENCOUNTER: ICD-10-CM

## 2022-01-29 PROCEDURE — 12001 RPR S/N/AX/GEN/TRNK 2.5CM/<: CPT | Performed by: PHYSICIAN ASSISTANT

## 2022-01-29 PROCEDURE — 90471 IMMUNIZATION ADMIN: CPT | Performed by: PHYSICIAN ASSISTANT

## 2022-01-29 PROCEDURE — 90715 TDAP VACCINE 7 YRS/> IM: CPT | Performed by: PHYSICIAN ASSISTANT

## 2022-01-29 PROCEDURE — 99213 OFFICE O/P EST LOW 20 MIN: CPT | Mod: 25 | Performed by: PHYSICIAN ASSISTANT

## 2022-01-29 PROCEDURE — 250N000009 HC RX 250: Performed by: PHYSICIAN ASSISTANT

## 2022-01-29 RX ORDER — LIDOCAINE HYDROCHLORIDE 10 MG/ML
5 INJECTION, SOLUTION EPIDURAL; INFILTRATION; INTRACAUDAL; PERINEURAL ONCE
Status: COMPLETED | OUTPATIENT
Start: 2022-01-29 | End: 2022-01-29

## 2022-01-29 RX ADMIN — LIDOCAINE HYDROCHLORIDE 5 ML: 10 INJECTION, SOLUTION EPIDURAL; INFILTRATION; INTRACAUDAL; PERINEURAL at 16:31

## 2022-01-29 ASSESSMENT — MIFFLIN-ST. JEOR: SCORE: 2148.18

## 2022-01-29 ASSESSMENT — PAIN SCALES - GENERAL: PAINLEVEL: MODERATE PAIN (4)

## 2022-01-29 NOTE — PROGRESS NOTES
ASSESSMENT/PLAN:    I have reviewed the nursing notes.  I have reviewed the findings, diagnosis, plan and need for follow up with the patient.    1. Laceration of left thumb without foreign body without damage to nail, initial encounter  - lidocaine (PF) (XYLOCAINE) 1 % injection 5 mL  - REPAIR SUPERFICIAL, WOUND BODY < =2.5CM  - 5 sutures placed, see below    2. Laceration of left index finger without foreign body without damage to nail, initial encounter  - REPAIR SUPERFICIAL, WOUND BODY < =2.5CM  - lidocaine (PF) (XYLOCAINE) 1 % injection 5 mL  - 4 sutures placed, see below  - Vital signs stable. PE consistent with laceration of left thumb and index finger. Refer to procedure note below for further description of suture/laceration repair. Suture removal in 10-14 days. Keep clean, dry and covered. Can shower as usual, avoid swimming in hot tubs/pools/lakes until sutures removed and laceration healed as can lead to potential infection. Tetanus: updated today. Antibiotic: triple antibiotic over site. Monitor for fevers, chills, signs of infection/cellulitis - if any concerning symptoms arise, patient agreeable to return. Patient is in agreement and understanding of the above treatment plan. All questions and concerns were addressed and answered to patient's satisfaction. AVS reviewed with patient.     For hand lacerations: wear gloves if performing duties/tasks where contamination can occur such as washing dishes (dirty water), gardening/mowing lawn/outdoor tasks, other tasks, etc.     Discussed warning signs/symptoms indicative of need to f/u    Follow up if symptoms persist or worsen or concerns    I explained my diagnostic considerations and recommendations to the patient, who voiced understanding and agreement with the treatment plan. All questions were answered. We discussed potential side effects of any prescribed or recommended therapies, as well as expectations for response to treatments.    Danay Loredo,  "JOVANNA  1/29/2022  4:27 PM    HPI:    Galen Frank is a 21 year old male  who presents to [facility] today for concerns of laceration to left hand. Injury occurred at 10 minutes prior to arrival. Hemostasis control: good. RHD male. He cut his hand on an open pocket knife in left pocket. Knife was clean.     Pain: variable/10  Changes to ROM/Strength: none  Treatments tried since injury: pressure to site.     Any allergies to suture or latex: No  Prior experience with local anesthetics: YES  Any adverse reaction to local anesthetics: No    Patient on blood thinners: No    Denies LOC. Denies SOB, fevers, chills, local or systemic signs of infection.     Immunization (Tetanus) UTD: No, 2010    PCP: MD Anton    History reviewed. No pertinent past medical history.  Past Surgical History:   Procedure Laterality Date     OTHER SURGICAL HISTORY      2006,600000,OTHER     Social History     Tobacco Use     Smoking status: Never Smoker     Smokeless tobacco: Never Used   Substance Use Topics     Alcohol use: No     No current outpatient medications on file.     Allergies   Allergen Reactions     Gentamicin      Other reaction(s): Angioedema     Past medical history, past surgical history, current medications and allergies reviewed and accurate to the best of my knowledge.      ROS:  Refer to HPI    /80 (BP Location: Right arm, Patient Position: Sitting, Cuff Size: Adult Large)   Pulse 85   Temp 98.2  F (36.8  C) (Tympanic)   Resp 16   Ht 1.791 m (5' 10.5\")   Wt 113.4 kg (250 lb)   SpO2 98%   BMI 35.36 kg/m       EXAM:  General Appearance: Well appearing 21 year old male, appropriate appearance for age. No acute distress   Respiratory: normal chest wall and respirations.  Normal effort.  Clear to auscultation bilaterally, no wheezing, crackles or rhonchi.  No increased work of breathing.  No cough appreciated.  Cardiac: RRR with no murmurs   Musculoskeletal:  Equal movement of bilateral upper " extremities.  Equal movement of bilateral lower extremities.  Normal gait.    Dermatological: 1 inch left thumb medial border and left index finger 1.2 cm medial border PIP  Psychological: normal affect, alert, oriented, and pleasant.     Labs:  None     Xray:  None     Procedural note:   Options are discussed and patient decided to proceed with the suture placement.  Risks and benefits discussed.  Written consent obtained.    Preparation: Patient was prepped and draped in usual sterile fashion.  Irrigation solution: saline   Body area: left thumb and left index finger  Laceration description: horizontal orientation x 2  Laceration length: see above   Contamination: No  Debridement: No  Foreign bodies: No  Tendon involvement: No  Anesthesia: Local  Anesthetic Type: Lidocaine 1% without epinephrine  Anesthetic Total: 4 mL (1.5 mL in index finger and 2.5 mL in thumb)  Closure: Simple  Suture: 4-0 nylon nonabsorbable, interrupted  Number of Sutures: 5 sutures left thumb, 4 sutures left index finger  Approximation: close  Suture removal in 10-14 day(s)     Patient tolerance: Patient tolerated the procedure well with no immediate complications.

## 2022-01-29 NOTE — NURSING NOTE
"Chief Complaint   Patient presents with     Laceration     Patient presented to the clinic with lacerations to his left thumb and pointer finger from an open knife in his pocket that occurred this evening.    Initial /80 (BP Location: Right arm, Patient Position: Sitting, Cuff Size: Adult Large)   Pulse 85   Temp 98.2  F (36.8  C) (Tympanic)   Resp 16   Ht 1.791 m (5' 10.5\")   Wt 113.4 kg (250 lb)   SpO2 98%   BMI 35.36 kg/m   Estimated body mass index is 35.36 kg/m  as calculated from the following:    Height as of this encounter: 1.791 m (5' 10.5\").    Weight as of this encounter: 113.4 kg (250 lb).       FOOD SECURITY SCREENING QUESTIONS:    The next two questions are to help us understand your food security.  If you are feeling you need any assistance in this area, we have resources available to support you today.    Hunger Vital Signs:  Within the past 12 months we worried whether our food would run out before we got money to buy more. Never  Within the past 12 months the food we bought just didn't last and we didn't have money to get more. Never      Medication Reconciliation: Complete      Jing Sandoval LPN  "

## 2022-01-29 NOTE — PATIENT INSTRUCTIONS
Please refer to your AVS for follow up and pain/symptoms management recommendations (I.e.: medications, helpful conservative treatment modalities, appropriate follow up if need to a specialist or family practice, etc.). Please return to urgent care if your symptoms change or worsen.     Discharge instructions:  -If you were prescribed a medication(s), please take this as prescribed/directed  -Monitor your symptoms, if changing/worsening, return to UC/ER or PCP for follow up    Laceration   -Depending on the extent of your wound it was closed with either dermabond glue, staples or sutures if needed.   -Most sutures/stitches stay in place for 7-14 days - your primary care provider can remove this  -Please continue to keep area clean/dry for 24 hrs. Dressing changes daily for 2-3 days.   -Monitor for infection.  Monitor for signs of infection such as fevers, chills, increasing redness/warmth of the site.   -Avoid swimming in pools/lakes until your site is healed.  -If your tetanus status is out of date, the urgent care provider likely discussed this with you. We recommend keeping your immunizations and tetanus status up to date.   -For pain control (if needed), if you are able to take Ibuprofen and Tylenol, we recommend alternating these (see note below). Do not wear a patch over your eye (unless directed to do so).    -Alternate every 4 hours as needed. I.e.: Ibuprofen at 8am, Tylenol 12pm, Ibuprofen 4pm    -Daily maximum of Tylenol is 4000mg (recommend staying under 3000mg)   -Daily maximum of Ibuprofen is 1200mg (take no more than six 200mg pills a day)

## 2022-09-17 ENCOUNTER — HEALTH MAINTENANCE LETTER (OUTPATIENT)
Age: 22
End: 2022-09-17

## 2023-05-06 ENCOUNTER — HEALTH MAINTENANCE LETTER (OUTPATIENT)
Age: 23
End: 2023-05-06

## 2023-05-21 ENCOUNTER — OFFICE VISIT (OUTPATIENT)
Dept: FAMILY MEDICINE | Facility: OTHER | Age: 23
End: 2023-05-21
Payer: COMMERCIAL

## 2023-05-21 VITALS
DIASTOLIC BLOOD PRESSURE: 88 MMHG | RESPIRATION RATE: 16 BRPM | WEIGHT: 238.8 LBS | HEIGHT: 70 IN | BODY MASS INDEX: 34.19 KG/M2 | HEART RATE: 86 BPM | OXYGEN SATURATION: 99 % | TEMPERATURE: 99.1 F | SYSTOLIC BLOOD PRESSURE: 138 MMHG

## 2023-05-21 DIAGNOSIS — S30.860A TICK BITE OF LOWER BACK, INITIAL ENCOUNTER: Primary | ICD-10-CM
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
## 2023-05-21 PROCEDURE — 99213 OFFICE O/P EST LOW 20 MIN: CPT

## 2023-05-21 RX ORDER — DOXYCYCLINE 100 MG/1
200 CAPSULE ORAL DAILY
Qty: 2 CAPSULE | Refills: 0 | Status: SHIPPED | OUTPATIENT
Start: 2023-05-21 | End: 2023-05-22

## 2023-05-21 ASSESSMENT — PAIN SCALES - GENERAL: PAINLEVEL: NO PAIN (0)

## 2023-05-21 NOTE — NURSING NOTE
Chief Complaint   Patient presents with     Insect Bites     Patient presents to the clinic today with brad for tick bite to lower right side of back. Patients brad stated she thinks it was a deer tick and the head may still be attached, found yesterday morning.     Sravani Izaguirre LPN       FOOD SECURITY SCREENING QUESTIONS:    The next two questions are to help us understand your food security.  If you are feeling you need any assistance in this area, we have resources available to support you today.    Hunger Vital Signs:  Within the past 12 months we worried whether our food would run out before we got money to buy more. Never  Within the past 12 months the food we bought just didn't last and we didn't have money to get more. Never  Sravani Izaguirre LPN,LPN on 5/21/2023 at 3:50 PM      Food Insecurity: Not on file

## 2023-05-21 NOTE — PROGRESS NOTES
ASSESSMENT/PLAN:    (S30.860A,  W57.XXXA) Tick bite of lower back, initial encounter  (primary encounter diagnosis)  Comment: Patient has a history of a tick bite, he believes it was a deer tick, it was removed yesterday, they believe it may have been attached for more than 36 hours.  Plan: doxycycline hyclate (VIBRAMYCIN) 100 MG capsule  Discussed with patient ALL 3 criteria in order to get 200 mg doxycycline prophylaxis which include: Deer tick, attachment for greater than 36 h and prophylaxis begun within 72 h of tick extraction.  Patient does meet criteria for prophylaxis.  Discussed that if tick is attached for less than 36 h there is a very low risk of transmission of Lyme's disease from vector (tick) to host (human/patient). Discussed that Lyme disease testing is typically not done this early on as it takes 3 to 4 weeks at minimum for the body to produce antibodies for test to be accurate, if done before this point time test can return with a false negative.  Recommended patient monitor for fevers, chills, stiffness in neck, lymphadenopathy, neurologic or cardiovascular changes or erythema migrans/rash, if any of these occur he should follow-up for reevaluation.       May use over-the-counter Tylenol or ibuprofen PRN    Discussed warning signs/symptoms indicative of need to f/u    Follow up if symptoms persist or worsen or concerns    I have reviewed the nursing notes.  I have reviewed the findings, diagnosis, plan and need for follow up with the patient.    I explained my diagnostic considerations and recommendations to the patient, who voiced understanding and agreement with the treatment plan. All questions were answered. We discussed potential side effects of any prescribed or recommended therapies, as well as expectations for response to treatments.    MAGO LATHAM, CECILIA CNP  5/21/2023  4:04 PM    HPI:    Galen Frank is a 23 year old male  who presents to [facility] today for concerns of  "tick bite.    Patient reports a tick bite to the lower right side of the back.  They believe it is a deer tick.  They found it yesterday morning.  They believe it has been attached for about 36 hours.    Allergy to gentamicin    PCP: Anton    No past medical history on file.  Past Surgical History:   Procedure Laterality Date     OTHER SURGICAL HISTORY      2006,600000,OTHER     Social History     Tobacco Use     Smoking status: Never     Smokeless tobacco: Never   Vaping Use     Vaping status: Never Used   Substance Use Topics     Alcohol use: Yes     Comment: about 6-12 beers on the weekends.     No current outpatient medications on file.     Allergies   Allergen Reactions     Gentamicin      Other reaction(s): Angioedema     Past medical history, past surgical history, current medications and allergies reviewed and accurate to the best of my knowledge.      ROS:  Refer to HPI    /88 (BP Location: Right arm, Patient Position: Sitting, Cuff Size: Adult Large)   Pulse 86   Temp 99.1  F (37.3  C) (Tympanic)   Resp 16   Ht 1.77 m (5' 9.69\")   Wt 108.3 kg (238 lb 12.8 oz)   SpO2 99%   BMI 34.57 kg/m      EXAM:  General Appearance: Well appearing 23 year old male, appropriate appearance for age. No acute distress   Eyes: conjunctivae normal without erythema or irritation, corneas clear, no drainage or crusting, no eyelid swelling, pupils equal   Oropharynx: moist mucous membranes, voice clear.    Nose:  Bilateral nares: no erythema, no edema, no drainage or congestion   Neck: supple   Respiratory: normal chest wall and respirations.  Normal effort.    No increased work of breathing.  No cough appreciated.  Musculoskeletal:  Equal movement of bilateral upper extremities.  Equal movement of bilateral lower extremities.  Normal gait.    Dermatological: Small erythematous area to lower right-sided back.  Neuro: Alert and oriented to person, place, and time.  Cranial nerves II-XII grossly intact with no focal " or lateralizing deficits.  Muscle tone normal.  Gait normal. No tremor.   Psychological: normal affect, alert, oriented, and pleasant.

## 2023-05-21 NOTE — PATIENT INSTRUCTIONS
Prophylactic doxycycline sent to pharmacy. Take one time dose. Avoid food high in calcium 2 hours before or 2 hours after taking medication. You may become more sensitive to sunlight. I recommend good sunscreen use for the next few days.   Monitor for fevers, chills, stiffness in neck, lymphadenopathy, neurologic or cardiovascular changes or erythema migrans/rash, if any of these occur he should follow-up for reevaluation.

## 2024-07-13 ENCOUNTER — HEALTH MAINTENANCE LETTER (OUTPATIENT)
Age: 24
End: 2024-07-13

## 2025-07-19 ENCOUNTER — HEALTH MAINTENANCE LETTER (OUTPATIENT)
Age: 25
End: 2025-07-19

## (undated) RX ORDER — LIDOCAINE HYDROCHLORIDE 10 MG/ML
INJECTION, SOLUTION EPIDURAL; INFILTRATION; INTRACAUDAL; PERINEURAL
Status: DISPENSED
Start: 2022-01-29